# Patient Record
Sex: MALE | Race: WHITE | NOT HISPANIC OR LATINO | Employment: FULL TIME | URBAN - METROPOLITAN AREA
[De-identification: names, ages, dates, MRNs, and addresses within clinical notes are randomized per-mention and may not be internally consistent; named-entity substitution may affect disease eponyms.]

---

## 2019-05-08 PROBLEM — F17.200 TOBACCO DEPENDENCE: Status: ACTIVE | Noted: 2019-05-08

## 2019-05-08 PROBLEM — F41.1 GENERALIZED ANXIETY DISORDER: Status: ACTIVE | Noted: 2019-05-08

## 2019-05-08 PROBLEM — I10 ESSENTIAL HYPERTENSION: Status: ACTIVE | Noted: 2019-05-08

## 2019-05-08 PROBLEM — R00.0 SINUS TACHYCARDIA: Status: ACTIVE | Noted: 2019-05-08

## 2023-11-29 ENCOUNTER — OFFICE VISIT (OUTPATIENT)
Dept: PRIMARY CARE CLINIC | Facility: CLINIC | Age: 48
End: 2023-11-29
Payer: COMMERCIAL

## 2023-11-29 VITALS
HEART RATE: 83 BPM | SYSTOLIC BLOOD PRESSURE: 158 MMHG | BODY MASS INDEX: 26.09 KG/M2 | HEIGHT: 73 IN | DIASTOLIC BLOOD PRESSURE: 76 MMHG | OXYGEN SATURATION: 97 % | WEIGHT: 196.88 LBS

## 2023-11-29 DIAGNOSIS — Z12.12 ENCOUNTER FOR COLORECTAL CANCER SCREENING: ICD-10-CM

## 2023-11-29 DIAGNOSIS — I10 ESSENTIAL HYPERTENSION: ICD-10-CM

## 2023-11-29 DIAGNOSIS — Z00.00 ANNUAL PHYSICAL EXAM: Primary | ICD-10-CM

## 2023-11-29 DIAGNOSIS — Z13.1 SCREENING FOR DIABETES MELLITUS: ICD-10-CM

## 2023-11-29 DIAGNOSIS — Z12.11 ENCOUNTER FOR COLORECTAL CANCER SCREENING: ICD-10-CM

## 2023-11-29 DIAGNOSIS — F41.1 GENERALIZED ANXIETY DISORDER: ICD-10-CM

## 2023-11-29 PROCEDURE — 3008F PR BODY MASS INDEX (BMI) DOCUMENTED: ICD-10-PCS | Mod: CPTII,S$GLB,, | Performed by: STUDENT IN AN ORGANIZED HEALTH CARE EDUCATION/TRAINING PROGRAM

## 2023-11-29 PROCEDURE — 99386 PR PREVENTIVE VISIT,NEW,40-64: ICD-10-PCS | Mod: S$GLB,,, | Performed by: STUDENT IN AN ORGANIZED HEALTH CARE EDUCATION/TRAINING PROGRAM

## 2023-11-29 PROCEDURE — 99999 PR PBB SHADOW E&M-NEW PATIENT-LVL IV: ICD-10-PCS | Mod: PBBFAC,,, | Performed by: STUDENT IN AN ORGANIZED HEALTH CARE EDUCATION/TRAINING PROGRAM

## 2023-11-29 PROCEDURE — 99999 PR PBB SHADOW E&M-NEW PATIENT-LVL IV: CPT | Mod: PBBFAC,,, | Performed by: STUDENT IN AN ORGANIZED HEALTH CARE EDUCATION/TRAINING PROGRAM

## 2023-11-29 PROCEDURE — 99386 PREV VISIT NEW AGE 40-64: CPT | Mod: S$GLB,,, | Performed by: STUDENT IN AN ORGANIZED HEALTH CARE EDUCATION/TRAINING PROGRAM

## 2023-11-29 PROCEDURE — 3078F DIAST BP <80 MM HG: CPT | Mod: CPTII,S$GLB,, | Performed by: STUDENT IN AN ORGANIZED HEALTH CARE EDUCATION/TRAINING PROGRAM

## 2023-11-29 PROCEDURE — 3008F BODY MASS INDEX DOCD: CPT | Mod: CPTII,S$GLB,, | Performed by: STUDENT IN AN ORGANIZED HEALTH CARE EDUCATION/TRAINING PROGRAM

## 2023-11-29 PROCEDURE — 3077F SYST BP >= 140 MM HG: CPT | Mod: CPTII,S$GLB,, | Performed by: STUDENT IN AN ORGANIZED HEALTH CARE EDUCATION/TRAINING PROGRAM

## 2023-11-29 PROCEDURE — 1160F RVW MEDS BY RX/DR IN RCRD: CPT | Mod: CPTII,S$GLB,, | Performed by: STUDENT IN AN ORGANIZED HEALTH CARE EDUCATION/TRAINING PROGRAM

## 2023-11-29 PROCEDURE — 1160F PR REVIEW ALL MEDS BY PRESCRIBER/CLIN PHARMACIST DOCUMENTED: ICD-10-PCS | Mod: CPTII,S$GLB,, | Performed by: STUDENT IN AN ORGANIZED HEALTH CARE EDUCATION/TRAINING PROGRAM

## 2023-11-29 PROCEDURE — 1159F MED LIST DOCD IN RCRD: CPT | Mod: CPTII,S$GLB,, | Performed by: STUDENT IN AN ORGANIZED HEALTH CARE EDUCATION/TRAINING PROGRAM

## 2023-11-29 PROCEDURE — 1159F PR MEDICATION LIST DOCUMENTED IN MEDICAL RECORD: ICD-10-PCS | Mod: CPTII,S$GLB,, | Performed by: STUDENT IN AN ORGANIZED HEALTH CARE EDUCATION/TRAINING PROGRAM

## 2023-11-29 PROCEDURE — 3077F PR MOST RECENT SYSTOLIC BLOOD PRESSURE >= 140 MM HG: ICD-10-PCS | Mod: CPTII,S$GLB,, | Performed by: STUDENT IN AN ORGANIZED HEALTH CARE EDUCATION/TRAINING PROGRAM

## 2023-11-29 PROCEDURE — 3078F PR MOST RECENT DIASTOLIC BLOOD PRESSURE < 80 MM HG: ICD-10-PCS | Mod: CPTII,S$GLB,, | Performed by: STUDENT IN AN ORGANIZED HEALTH CARE EDUCATION/TRAINING PROGRAM

## 2023-11-29 RX ORDER — LISINOPRIL AND HYDROCHLOROTHIAZIDE 10; 12.5 MG/1; MG/1
1 TABLET ORAL DAILY
Qty: 90 TABLET | Refills: 3 | Status: SHIPPED | OUTPATIENT
Start: 2023-11-29 | End: 2023-11-29 | Stop reason: SDUPTHER

## 2023-11-29 RX ORDER — CITALOPRAM 20 MG/1
20 TABLET, FILM COATED ORAL DAILY
Qty: 90 TABLET | Refills: 3 | Status: SHIPPED | OUTPATIENT
Start: 2023-11-29

## 2023-11-29 RX ORDER — LISINOPRIL AND HYDROCHLOROTHIAZIDE 10; 12.5 MG/1; MG/1
1 TABLET ORAL DAILY
Qty: 90 TABLET | Refills: 3 | Status: SHIPPED | OUTPATIENT
Start: 2023-11-29

## 2023-11-29 NOTE — TELEPHONE ENCOUNTER
----- Message from Bucky Goodwin sent at 11/29/2023  9:21 AM CST -----  Name Of Caller: Sumanth        Provider Name: Ana Marrufo        Does patient feel the need to be seen today? no        Relationship to the Pt?: patient        Contact Preference?:  [phone]        What is the nature of the call?:Patient states that he needs to speak with someone in the office in regards to his medication, lisinopriL-hydrochlorothiazide (PRINZIDE,ZESTORETIC) 10-12.5 mg per tablet.

## 2023-11-29 NOTE — PROGRESS NOTES
SUBJECTIVE     Chief Complaint   Patient presents with    Establish Care    Annual Exam       HPI  Sumanth Marroquin is a 48 y.o. male with medical diagnoses as listed in the medical history and problem list that presents for annual exam. Pt is establishing care with me today.    Pt is UTD on age appropriate CA screening.    Family, social, surgical Hx reviewed     HTN: Lisinopril - hctz 10-12.5 mg daily metoprolol tartrate 25 mg daily    RICK: Celexa 20 mg daily    Currently daily pack per day smoker    Health Maintenance         Date Due Completion Date    Hepatitis C Screening Never done ---    Lipid Panel Never done ---    COVID-19 Vaccine (1) Never done ---    Pneumococcal Vaccines (Age 0-64) (1 - PCV) Never done ---    HIV Screening Never done ---    TETANUS VACCINE Never done ---    Hemoglobin A1c (Diabetic Prevention Screening) Never done ---    Colorectal Cancer Screening Never done ---    Influenza Vaccine (1) Never done ---                PAST MEDICAL HISTORY:  Past Medical History:   Diagnosis Date    Depression     Hypertension        PAST SURGICAL HISTORY:  No past surgical history on file.    SOCIAL HISTORY:  Social History     Socioeconomic History    Marital status: Single   Tobacco Use    Smoking status: Heavy Smoker     Current packs/day: 1.00     Types: Cigarettes   Substance and Sexual Activity    Alcohol use: Yes     Comment: occasionally    Drug use: Not Currently       FAMILY HISTORY:  No family history on file.    ALLERGIES AND MEDICATIONS: updated and reviewed.  Review of patient's allergies indicates:   Allergen Reactions    Penicillins Rash     Current Outpatient Medications   Medication Sig Dispense Refill    metoprolol tartrate (LOPRESSOR) 25 MG tablet Take 1 tablet (25 mg total) by mouth 2 (two) times daily as needed (for tachycardia). 15 tablet 0    citalopram (CELEXA) 20 MG tablet Take 1 tablet (20 mg total) by mouth once daily. 90 tablet 3    lisinopriL-hydrochlorothiazide  "(PRINZIDE,ZESTORETIC) 10-12.5 mg per tablet Take 1 tablet by mouth once daily. 90 tablet 3    lovastatin (MEVACOR) 20 MG tablet Take 1 tablet(s) every day by oral route.  0     No current facility-administered medications for this visit.       ROS  Review of Systems   Constitutional:  Negative for fever and weight loss.   Respiratory:  Negative for cough and shortness of breath.    Cardiovascular:  Negative for chest pain and palpitations.   Gastrointestinal:  Negative for abdominal pain, constipation, diarrhea, nausea and vomiting.   Genitourinary:  Negative for dysuria.   Musculoskeletal:  Negative for back pain and joint pain.   Skin:  Negative for rash.   Neurological:  Negative for dizziness, weakness and headaches.   Psychiatric/Behavioral:  Negative for depression. The patient is not nervous/anxious.            OBJECTIVE     Physical Exam  Vitals:    11/29/23 0735   BP: (!) 158/76   Pulse: 83    Body mass index is 25.97 kg/m².  Weight: 89.3 kg (196 lb 13.9 oz)   Height: 6' 1" (185.4 cm)     Physical Exam  HENT:      Head: Normocephalic and atraumatic.      Nose: Nose normal.      Mouth/Throat:      Mouth: Mucous membranes are moist.      Pharynx: Oropharynx is clear.   Eyes:      Extraocular Movements: Extraocular movements intact.      Conjunctiva/sclera: Conjunctivae normal.      Pupils: Pupils are equal, round, and reactive to light.   Cardiovascular:      Rate and Rhythm: Normal rate and regular rhythm.   Pulmonary:      Effort: Pulmonary effort is normal.      Breath sounds: Normal breath sounds.   Musculoskeletal:         General: No swelling. Normal range of motion.      Cervical back: Normal range of motion.      Right lower leg: No edema.      Left lower leg: No edema.   Skin:     General: Skin is warm.      Findings: No lesion or rash.   Neurological:      General: No focal deficit present.      Mental Status: He is alert and oriented to person, place, and time.      Motor: No weakness. "               ASSESSMENT     48 y.o. male with     1. Annual physical exam    2. Essential hypertension    3. Generalized anxiety disorder    4. Screening for diabetes mellitus    5. Encounter for colorectal cancer screening        PLAN:     1. Annual physical exam  -     HIV 1/2 Ag/Ab (4th Gen); Future; Expected date: 11/29/2023  -     Hepatitis C Antibody; Future; Expected date: 11/29/2023  -     TSH; Future; Expected date: 11/29/2023  -     Lipid Panel; Future; Expected date: 11/29/2023  -     Comprehensive Metabolic Panel; Future; Expected date: 11/29/2023  -     CBC Auto Differential; Future; Expected date: 11/29/2023    2. Essential hypertension  Overview:  Lisinopril - hctz 10-12.5 mg daily metoprolol tartrate 25 mg daily  Not at goal-out of medication  Follow up with reading in 2 weeks      Orders:  -     lisinopriL-hydrochlorothiazide (PRINZIDE,ZESTORETIC) 10-12.5 mg per tablet; Take 1 tablet by mouth once daily.  Dispense: 90 tablet; Refill: 3    3. Generalized anxiety disorder  Overview:  Celexa 20 mg daily    Stable on medications, continue regimen    -     citalopram (CELEXA) 20 MG tablet; Take 1 tablet (20 mg total) by mouth once daily.  Dispense: 90 tablet; Refill: 3    4. Screening for diabetes mellitus  -     Hemoglobin A1C; Future; Expected date: 11/29/2023    5. Encounter for colorectal cancer screening  Patient not interested in pursuing Colorectal cancer screening at this time.  Discussed importance of screening.  Will follow-up with patient in 1 month to discuss if he would prefer Cologuard test versus colonoscopy.            Discussed age and gender appropriate screenings at this visit and encouraged a healthy diet low in simple carbohydrates, and increased physical activity.  Counseled on medically appropriate vaccines based on age and current health condition.  Screening test reviewed and discussed with patient.       RTC in 6 months    Ana Marrufo MD  11/29/2023 7:42 AM

## 2023-12-15 ENCOUNTER — TELEPHONE (OUTPATIENT)
Dept: PRIMARY CARE CLINIC | Facility: CLINIC | Age: 48
End: 2023-12-15
Payer: COMMERCIAL

## 2023-12-15 NOTE — TELEPHONE ENCOUNTER
Called pt, no answer, lvm to call back    *need to ask colon test or colonoscopy and schedule bp check unless he has recent bp reading log

## 2023-12-15 NOTE — TELEPHONE ENCOUNTER
----- Message from Eva Hoffman MA sent at 12/15/2023  8:29 AM CST -----      ----- Message -----  From: Ana Marrufo MD  Sent: 12/15/2023   7:58 AM CST  To: Yaron ANDERSON Staff    Also, we discussed colon cancer screening at his last appointment and he stated he needed time to decide.  Can we ask if he would like to do the Cologuard (stool test) versus the colonoscopy?  ----- Message -----  From: Ana Marrufo MD  Sent: 12/12/2023  12:00 AM CST  To: Ana Marrufo MD    Cologuard vs colonoscopy?

## 2024-02-06 DIAGNOSIS — Z12.11 COLON CANCER SCREENING: ICD-10-CM

## 2024-08-30 ENCOUNTER — TELEPHONE (OUTPATIENT)
Dept: PHARMACY | Facility: CLINIC | Age: 49
End: 2024-08-30
Payer: COMMERCIAL

## 2024-08-30 NOTE — TELEPHONE ENCOUNTER
[facility]/Population Health Chart Review & Patient Outreach Details For Medication Adherence Project    Reason for Outreach Encounter: 3rd Party payor non-compliance report ([employer], [employer], C, etc)    2.  Patient Outreach Method: Reviewed patient chart     3.   Medication in question:   Hypertension Medications               lisinopriL-hydrochlorothiazide (PRINZIDE,ZESTORETIC) 10-12.5 mg per tablet Take 1 tablet by mouth once daily.    metoprolol tartrate (LOPRESSOR) 25 MG tablet Take 1 tablet (25 mg total) by mouth 2 (two) times daily as needed (for tachycardia).               LAST FILLED: 7/16/24 for 90 day supply    4.  Reviewed and or Updates Made To: Patient Chart    5. Outreach Outcomes and/or actions taken: Patient filled medication and is on track to be adherent    Additional Notes:

## 2025-01-29 DIAGNOSIS — I10 ESSENTIAL HYPERTENSION: ICD-10-CM

## 2025-02-02 DIAGNOSIS — I10 ESSENTIAL HYPERTENSION: ICD-10-CM

## 2025-02-02 RX ORDER — CITALOPRAM 20 MG/1
20 TABLET, FILM COATED ORAL
Qty: 90 TABLET | Refills: 0 | Status: SHIPPED | OUTPATIENT
Start: 2025-02-02

## 2025-02-02 NOTE — TELEPHONE ENCOUNTER
Care Due:                  Date            Visit Type   Department     Provider  --------------------------------------------------------------------------------                                NP -                              PRIMARY      [facility]  Last Visit: 11-      CARE ([facility])   CARE           Ana Marrufo  Next Visit: None Scheduled  None         None Found                                                            Last  Test          Frequency    Reason                     Performed    Due Date  --------------------------------------------------------------------------------    Office Visit  15 months..  citalopram,                11- 02-                             lisinopriL-hydrochlorothi                             azide....................    CMP.........  12 months..  lisinopriL-hydrochlorothi  Not Found    Overdue                             azide....................    [employer] Embedded Care Due Messages. Reference number: [id number].   2/02/2025 12:22:23 AM CST

## 2025-02-03 RX ORDER — LISINOPRIL AND HYDROCHLOROTHIAZIDE 10; 12.5 MG/1; MG/1
1 TABLET ORAL
Qty: 90 TABLET | Refills: 3 | Status: SHIPPED | OUTPATIENT
Start: 2025-02-03

## 2025-02-03 NOTE — TELEPHONE ENCOUNTER
Refill Routing Note   Medication(s) are not appropriate for processing by [facility] for the following reason(s):        Required labs outdated  Required vitals outdated    [facility] action(s):  Defer  Approve   Requires appointment : Yes   Requires labs : Yes             Appointments  past 12m or future 3m with PCP    Date Provider   Last Visit   11/29/2023 Ana Marrufo MD   Next Visit   Visit date not found Ana Marrufo MD   ED visits in past 90 days: 0        Note composed:10:39 PM 02/02/2025

## 2025-05-08 ENCOUNTER — TELEPHONE (OUTPATIENT)
Dept: PHARMACY | Facility: CLINIC | Age: 50
End: 2025-05-08
Payer: COMMERCIAL

## 2025-05-08 NOTE — TELEPHONE ENCOUNTER
[facility]/Population Health Chart Review & Patient Outreach Details For Medication Adherence Project    Reason for Outreach Encounter: 3rd Party payor non-compliance report ([employer], [employer], C, etc)  2.  Patient Outreach Method: Reviewed Patient Chart  3.   Medication in question: prinzide    LAST FILLED: 5/5/25 for 90 day supply  Hypertension Medications              lisinopriL-hydrochlorothiazide (PRINZIDE,ZESTORETIC) 10-12.5 mg per tablet TAKE 1 TABLET BY MOUTH EVERY DAY    metoprolol tartrate (LOPRESSOR) 25 MG tablet Take 1 tablet (25 mg total) by mouth 2 (two) times daily as needed (for tachycardia).              4.  Reviewed and or Updates Made To: Patient Chart  5. Outreach Outcomes and/or actions taken: Patient filled medication and is on track to be adherent

## 2025-05-09 ENCOUNTER — TELEPHONE (OUTPATIENT)
Dept: PRIMARY CARE CLINIC | Facility: CLINIC | Age: 50
End: 2025-05-09
Payer: COMMERCIAL

## 2025-05-09 DIAGNOSIS — Z00.00 ANNUAL PHYSICAL EXAM: ICD-10-CM

## 2025-05-09 DIAGNOSIS — Z13.1 SCREENING FOR DIABETES MELLITUS: Primary | ICD-10-CM

## 2025-05-09 DIAGNOSIS — Z12.5 PROSTATE CANCER SCREENING: ICD-10-CM

## 2025-05-09 NOTE — TELEPHONE ENCOUNTER
----- Message from Flor sent at 5/9/2025  9:39 AM CDT -----  Contact:   patient  type: Skye is requesting to schedule their Lab appointment prior to an appointment.Order is not listed in EPIC.  Please enter order and contact patient to schedule.Preferred Date and Time of Labs: 05 27 2025 8 am Date of Appointment 06 03 2025 [facility] [place] [facility] rather a call back or a response via [facility]? Yes - Call Best Call Back Number: [phone] Information:na

## 2025-06-03 ENCOUNTER — PATIENT MESSAGE (OUTPATIENT)
Dept: PRIMARY CARE CLINIC | Facility: CLINIC | Age: 50
End: 2025-06-03

## 2025-06-03 ENCOUNTER — OFFICE VISIT (OUTPATIENT)
Dept: PRIMARY CARE CLINIC | Facility: CLINIC | Age: 50
End: 2025-06-03
Payer: COMMERCIAL

## 2025-06-03 VITALS
SYSTOLIC BLOOD PRESSURE: 160 MMHG | OXYGEN SATURATION: 96 % | WEIGHT: 205 LBS | DIASTOLIC BLOOD PRESSURE: 100 MMHG | BODY MASS INDEX: 27.05 KG/M2 | HEART RATE: 115 BPM

## 2025-06-03 DIAGNOSIS — Z13.1 SCREENING FOR DIABETES MELLITUS: ICD-10-CM

## 2025-06-03 DIAGNOSIS — R00.0 TACHYCARDIA: ICD-10-CM

## 2025-06-03 DIAGNOSIS — Z12.5 PROSTATE CANCER SCREENING: ICD-10-CM

## 2025-06-03 DIAGNOSIS — Z00.00 ANNUAL PHYSICAL EXAM: Primary | ICD-10-CM

## 2025-06-03 DIAGNOSIS — F17.219 CIGARETTE NICOTINE DEPENDENCE WITH NICOTINE-INDUCED DISORDER: ICD-10-CM

## 2025-06-03 DIAGNOSIS — R22.32 LUMP OF AXILLA, LEFT: Primary | ICD-10-CM

## 2025-06-03 DIAGNOSIS — I10 ESSENTIAL HYPERTENSION: ICD-10-CM

## 2025-06-03 PROCEDURE — 1160F RVW MEDS BY RX/DR IN RCRD: CPT | Mod: CPTII,S$GLB,, | Performed by: STUDENT IN AN ORGANIZED HEALTH CARE EDUCATION/TRAINING PROGRAM

## 2025-06-03 PROCEDURE — 3077F SYST BP >= 140 MM HG: CPT | Mod: CPTII,S$GLB,, | Performed by: STUDENT IN AN ORGANIZED HEALTH CARE EDUCATION/TRAINING PROGRAM

## 2025-06-03 PROCEDURE — 93005 ELECTROCARDIOGRAM TRACING: CPT | Mod: S$GLB,,, | Performed by: STUDENT IN AN ORGANIZED HEALTH CARE EDUCATION/TRAINING PROGRAM

## 2025-06-03 PROCEDURE — 99396 PREV VISIT EST AGE 40-64: CPT | Mod: S$GLB,,, | Performed by: STUDENT IN AN ORGANIZED HEALTH CARE EDUCATION/TRAINING PROGRAM

## 2025-06-03 PROCEDURE — 99999 PR PBB SHADOW E&M-EST. PATIENT-LVL IV: CPT | Mod: PBBFAC,,, | Performed by: STUDENT IN AN ORGANIZED HEALTH CARE EDUCATION/TRAINING PROGRAM

## 2025-06-03 PROCEDURE — 3008F BODY MASS INDEX DOCD: CPT | Mod: CPTII,S$GLB,, | Performed by: STUDENT IN AN ORGANIZED HEALTH CARE EDUCATION/TRAINING PROGRAM

## 2025-06-03 PROCEDURE — 1159F MED LIST DOCD IN RCRD: CPT | Mod: CPTII,S$GLB,, | Performed by: STUDENT IN AN ORGANIZED HEALTH CARE EDUCATION/TRAINING PROGRAM

## 2025-06-03 PROCEDURE — 93010 ELECTROCARDIOGRAM REPORT: CPT | Mod: S$GLB,,, | Performed by: INTERNAL MEDICINE

## 2025-06-03 PROCEDURE — 4010F ACE/ARB THERAPY RXD/TAKEN: CPT | Mod: CPTII,S$GLB,, | Performed by: STUDENT IN AN ORGANIZED HEALTH CARE EDUCATION/TRAINING PROGRAM

## 2025-06-03 PROCEDURE — 3080F DIAST BP >= 90 MM HG: CPT | Mod: CPTII,S$GLB,, | Performed by: STUDENT IN AN ORGANIZED HEALTH CARE EDUCATION/TRAINING PROGRAM

## 2025-06-03 PROCEDURE — 99215 OFFICE O/P EST HI 40 MIN: CPT | Mod: 25,S$GLB,, | Performed by: STUDENT IN AN ORGANIZED HEALTH CARE EDUCATION/TRAINING PROGRAM

## 2025-06-03 RX ORDER — METOPROLOL TARTRATE 25 MG/1
25 TABLET, FILM COATED ORAL 2 TIMES DAILY PRN
Qty: 15 TABLET | Refills: 0 | Status: SHIPPED | OUTPATIENT
Start: 2025-06-03 | End: 2025-06-05 | Stop reason: SDUPTHER

## 2025-06-03 RX ORDER — LISINOPRIL AND HYDROCHLOROTHIAZIDE 10; 12.5 MG/1; MG/1
1 TABLET ORAL DAILY
Qty: 90 TABLET | Refills: 3 | Status: SHIPPED | OUTPATIENT
Start: 2025-06-03

## 2025-06-04 LAB
OHS QRS DURATION: 78 MS
OHS QTC CALCULATION: 478 MS

## 2025-06-05 RX ORDER — METOPROLOL TARTRATE 25 MG/1
25 TABLET, FILM COATED ORAL 2 TIMES DAILY PRN
Qty: 60 TABLET | Refills: 5 | Status: SHIPPED | OUTPATIENT
Start: 2025-06-05

## 2025-06-08 DIAGNOSIS — R22.32 MASS OF LEFT AXILLA: Primary | ICD-10-CM

## 2025-06-08 DIAGNOSIS — R00.0 TACHYCARDIA: ICD-10-CM

## 2025-06-08 DIAGNOSIS — I10 ESSENTIAL HYPERTENSION: ICD-10-CM

## 2025-06-08 RX ORDER — METOPROLOL TARTRATE 25 MG/1
25 TABLET, FILM COATED ORAL 2 TIMES DAILY PRN
Qty: 60 TABLET | Refills: 5 | Status: SHIPPED | OUTPATIENT
Start: 2025-06-08

## 2025-06-12 ENCOUNTER — TELEPHONE (OUTPATIENT)
Dept: SMOKING CESSATION | Facility: CLINIC | Age: 50
End: 2025-06-12
Payer: COMMERCIAL

## 2025-06-12 ENCOUNTER — CLINICAL SUPPORT (OUTPATIENT)
Dept: SMOKING CESSATION | Facility: CLINIC | Age: 50
End: 2025-06-12
Payer: COMMERCIAL

## 2025-06-12 DIAGNOSIS — F17.200 NICOTINE DEPENDENCE: Primary | ICD-10-CM

## 2025-06-12 RX ORDER — VARENICLINE TARTRATE 0.5 (11)-1
KIT ORAL
Qty: 53 TABLET | Refills: 0 | Status: SHIPPED | OUTPATIENT
Start: 2025-06-12

## 2025-06-12 NOTE — TELEPHONE ENCOUNTER
Smoking Cessation called patient regarding no show virtual quit intake appointment this afternoon. Left a message with contact for patient to call back to reschedule intake.  Mary Gaming RRT,[facility]  [phone]

## 2025-06-12 NOTE — PROGRESS NOTES
The patient location is: home  The chief complaint leading to consultation is: nicotine dependence      Visit type: audiovisual  Face to Face time with patient: 60 minutes of total time spent on the encounter, which includes face to face time and non-face to face time preparing to see the patient (eg, review of tests), Obtaining and/or reviewing separately obtained history, Documenting clinical information in the electronic or other health record, Independently interpreting results (not separately reported) and communicating results to the patient/family/caregiver, or Care coordination (not separately reported). Technical problem, had to reconnect after virtual problem.      Each patient to whom he or she provides medical services by telemedicine is:  (1) informed of the relationship between the physician and patient and the respective role of any other health care provider with respect to management of the patient; and (2) notified that he or she may decline to receive medical services by telemedicine and may withdraw from such care at any time.     Note:  Patient reports smoking up to 1 pack of cigarettes a day on days that he is drinking in the evening he may smoke up to 2 pks. FTND score of 8 indicates a high level of nicotine dependence. MORGAN-D score of 24 perceived as significant degree of mental distress / probable depression. Patient will begin the prescribed tobacco cessation medication regimen of 0.5-1 mg Chantix. Nicotine patch was not prescribed due to high BP. Patient monitors BP at home.  Discussed and reviewed with the patient regarding NRT's and medication along with behavioral therapy & counseling to assist patient with meeting his goal of being smoke free. Patient is agreeable to participate in bi-weekly sessions as well as group therapy when it is available. Patient educated on role & usage possible side effects. Reviewed behavioral modification strategy of rate reduction and wait time of 15 min  prior to smoking. Patient verbalized understanding & willingness to apply. Patient instructed to call TTS with any questions or concerns.

## 2025-06-16 ENCOUNTER — TELEPHONE (OUTPATIENT)
Dept: PRIMARY CARE CLINIC | Facility: CLINIC | Age: 50
End: 2025-06-16
Payer: COMMERCIAL

## 2025-06-16 NOTE — TELEPHONE ENCOUNTER
Copied from CRM #[id number]. Topic: Appointments - Appointment Access  >> Jun 16, 2025 10:55 AM [place] wrote:  Type: General Call Back         Name of Caller:pt  Symptoms:high blood pressure  Would the patient rather a call back or a response via [facility]? call  Best Call Back Number:[phone]   Additional Information: Pt state yesterday his blood pressure was 195/111 and today it's 170/100. Pt has to take a flight to [place] for work tomorrow and wants to know if it's safe for him to fly.

## 2025-06-16 NOTE — TELEPHONE ENCOUNTER
Take 2 of lisinopril/Hctz and monitor BP twice daily while on trip.    If HA/CP/SOB/dizziness, needs to go to ED

## 2025-06-18 ENCOUNTER — LAB VISIT (OUTPATIENT)
Dept: LAB | Facility: HOSPITAL | Age: 50
End: 2025-06-18
Attending: STUDENT IN AN ORGANIZED HEALTH CARE EDUCATION/TRAINING PROGRAM
Payer: COMMERCIAL

## 2025-06-18 ENCOUNTER — RESULTS FOLLOW-UP (OUTPATIENT)
Dept: PRIMARY CARE CLINIC | Facility: CLINIC | Age: 50
End: 2025-06-18
Payer: COMMERCIAL

## 2025-06-18 DIAGNOSIS — Z12.5 PROSTATE CANCER SCREENING: ICD-10-CM

## 2025-06-18 DIAGNOSIS — Z13.1 SCREENING FOR DIABETES MELLITUS: ICD-10-CM

## 2025-06-18 DIAGNOSIS — Z00.00 ANNUAL PHYSICAL EXAM: ICD-10-CM

## 2025-06-18 LAB
ABSOLUTE EOSINOPHIL (OHS): 0.14 K/UL
ABSOLUTE MONOCYTE (OHS): 1.01 K/UL (ref 0.3–1)
ABSOLUTE NEUTROPHIL COUNT (OHS): 7.75 K/UL (ref 1.8–7.7)
ALBUMIN SERPL BCP-MCNC: 4.4 G/DL (ref 3.5–5.2)
ALP SERPL-CCNC: 83 UNIT/L (ref 40–150)
ALT SERPL W/O P-5'-P-CCNC: 81 UNIT/L (ref 10–44)
ANION GAP (OHS): 9 MMOL/L (ref 8–16)
AST SERPL-CCNC: 38 UNIT/L (ref 11–45)
BASOPHILS # BLD AUTO: 0.12 K/UL
BASOPHILS NFR BLD AUTO: 0.9 %
BILIRUB SERPL-MCNC: 0.7 MG/DL (ref 0.1–1)
BUN SERPL-MCNC: 18 MG/DL (ref 6–20)
CALCIUM SERPL-MCNC: 9.4 MG/DL (ref 8.7–10.5)
CHLORIDE SERPL-SCNC: 94 MMOL/L (ref 95–110)
CHOLEST SERPL-MCNC: 253 MG/DL (ref 120–199)
CHOLEST/HDLC SERPL: 8.4 {RATIO} (ref 2–5)
CO2 SERPL-SCNC: 29 MMOL/L (ref 23–29)
CREAT SERPL-MCNC: 1.3 MG/DL (ref 0.5–1.4)
EAG (OHS): 117 MG/DL (ref 68–131)
ERYTHROCYTE [DISTWIDTH] IN BLOOD BY AUTOMATED COUNT: 12.6 % (ref 11.5–14.5)
GFR SERPLBLD CREATININE-BSD FMLA CKD-EPI: >60 ML/MIN/1.73/M2
GLUCOSE SERPL-MCNC: 127 MG/DL (ref 70–110)
HBA1C MFR BLD: 5.7 % (ref 4–5.6)
HCT VFR BLD AUTO: 54.8 % (ref 40–54)
HDLC SERPL-MCNC: 30 MG/DL (ref 40–75)
HDLC SERPL: 11.9 % (ref 20–50)
HGB BLD-MCNC: 19 GM/DL (ref 14–18)
IMM GRANULOCYTES # BLD AUTO: 0.05 K/UL (ref 0–0.04)
IMM GRANULOCYTES NFR BLD AUTO: 0.4 % (ref 0–0.5)
LDLC SERPL CALC-MCNC: 163.8 MG/DL (ref 63–159)
LYMPHOCYTES # BLD AUTO: 4.22 K/UL (ref 1–4.8)
MCH RBC QN AUTO: 34.5 PG (ref 27–31)
MCHC RBC AUTO-ENTMCNC: 34.7 G/DL (ref 32–36)
MCV RBC AUTO: 100 FL (ref 82–98)
NONHDLC SERPL-MCNC: 223 MG/DL
NUCLEATED RBC (/100WBC) (OHS): 0 /100 WBC
PLATELET # BLD AUTO: 302 K/UL (ref 150–450)
PMV BLD AUTO: 11.1 FL (ref 9.2–12.9)
POTASSIUM SERPL-SCNC: 4 MMOL/L (ref 3.5–5.1)
PROT SERPL-MCNC: 8 GM/DL (ref 6–8.4)
PSA SERPL-MCNC: 1.04 NG/ML
RBC # BLD AUTO: 5.5 M/UL (ref 4.6–6.2)
RELATIVE EOSINOPHIL (OHS): 1.1 %
RELATIVE LYMPHOCYTE (OHS): 31.8 % (ref 18–48)
RELATIVE MONOCYTE (OHS): 7.6 % (ref 4–15)
RELATIVE NEUTROPHIL (OHS): 58.2 % (ref 38–73)
SODIUM SERPL-SCNC: 132 MMOL/L (ref 136–145)
TRIGL SERPL-MCNC: 296 MG/DL (ref 30–150)
TSH SERPL-ACNC: 1.64 UIU/ML (ref 0.4–4)
WBC # BLD AUTO: 13.29 K/UL (ref 3.9–12.7)

## 2025-06-18 PROCEDURE — 85025 COMPLETE CBC W/AUTO DIFF WBC: CPT

## 2025-06-18 PROCEDURE — 36415 COLL VENOUS BLD VENIPUNCTURE: CPT

## 2025-06-18 PROCEDURE — 83036 HEMOGLOBIN GLYCOSYLATED A1C: CPT

## 2025-06-18 PROCEDURE — 82465 ASSAY BLD/SERUM CHOLESTEROL: CPT

## 2025-06-18 PROCEDURE — 82947 ASSAY GLUCOSE BLOOD QUANT: CPT

## 2025-06-18 PROCEDURE — 84443 ASSAY THYROID STIM HORMONE: CPT

## 2025-06-18 PROCEDURE — 84153 ASSAY OF PSA TOTAL: CPT

## 2025-06-20 ENCOUNTER — PATIENT MESSAGE (OUTPATIENT)
Dept: PRIMARY CARE CLINIC | Facility: CLINIC | Age: 50
End: 2025-06-20
Payer: COMMERCIAL

## 2025-06-23 ENCOUNTER — OFFICE VISIT (OUTPATIENT)
Dept: CARDIOLOGY | Facility: CLINIC | Age: 50
End: 2025-06-23
Payer: COMMERCIAL

## 2025-06-23 VITALS
WEIGHT: 199.5 LBS | SYSTOLIC BLOOD PRESSURE: 146 MMHG | BODY MASS INDEX: 26.44 KG/M2 | DIASTOLIC BLOOD PRESSURE: 104 MMHG | HEART RATE: 80 BPM | HEIGHT: 73 IN

## 2025-06-23 DIAGNOSIS — E78.2 MIXED HYPERLIPIDEMIA: ICD-10-CM

## 2025-06-23 DIAGNOSIS — R00.0 TACHYCARDIA: ICD-10-CM

## 2025-06-23 DIAGNOSIS — Z91.89 FRAMINGHAM CARDIAC RISK >20% IN NEXT 10 YEARS: Primary | ICD-10-CM

## 2025-06-23 DIAGNOSIS — F17.200 TOBACCO DEPENDENCE: ICD-10-CM

## 2025-06-23 DIAGNOSIS — I10 ESSENTIAL HYPERTENSION: ICD-10-CM

## 2025-06-23 DIAGNOSIS — R73.03 PREDIABETES: ICD-10-CM

## 2025-06-23 DIAGNOSIS — R00.0 SINUS TACHYCARDIA: ICD-10-CM

## 2025-06-23 PROCEDURE — 3077F SYST BP >= 140 MM HG: CPT | Mod: CPTII,S$GLB,, | Performed by: INTERNAL MEDICINE

## 2025-06-23 PROCEDURE — 99204 OFFICE O/P NEW MOD 45 MIN: CPT | Mod: S$GLB,,, | Performed by: INTERNAL MEDICINE

## 2025-06-23 PROCEDURE — 3080F DIAST BP >= 90 MM HG: CPT | Mod: CPTII,S$GLB,, | Performed by: INTERNAL MEDICINE

## 2025-06-23 PROCEDURE — 99999 PR PBB SHADOW E&M-EST. PATIENT-LVL III: CPT | Mod: PBBFAC,,, | Performed by: INTERNAL MEDICINE

## 2025-06-23 PROCEDURE — 1159F MED LIST DOCD IN RCRD: CPT | Mod: CPTII,S$GLB,, | Performed by: INTERNAL MEDICINE

## 2025-06-23 PROCEDURE — 3044F HG A1C LEVEL LT 7.0%: CPT | Mod: CPTII,S$GLB,, | Performed by: INTERNAL MEDICINE

## 2025-06-23 PROCEDURE — 4010F ACE/ARB THERAPY RXD/TAKEN: CPT | Mod: CPTII,S$GLB,, | Performed by: INTERNAL MEDICINE

## 2025-06-23 PROCEDURE — 3008F BODY MASS INDEX DOCD: CPT | Mod: CPTII,S$GLB,, | Performed by: INTERNAL MEDICINE

## 2025-06-23 RX ORDER — METOPROLOL TARTRATE 25 MG/1
25 TABLET, FILM COATED ORAL 2 TIMES DAILY PRN
Qty: 60 TABLET | Refills: 11 | Status: SHIPPED | OUTPATIENT
Start: 2025-06-23

## 2025-06-23 RX ORDER — ASPIRIN 81 MG/1
81 TABLET ORAL DAILY
COMMUNITY
Start: 2025-06-23 | End: 2026-06-23

## 2025-06-23 RX ORDER — EZETIMIBE 10 MG/1
10 TABLET ORAL DAILY
Qty: 30 TABLET | Refills: 11 | Status: SHIPPED | OUTPATIENT
Start: 2025-06-23 | End: 2026-06-23

## 2025-06-23 RX ORDER — ROSUVASTATIN CALCIUM 40 MG/1
40 TABLET, COATED ORAL NIGHTLY
Qty: 30 TABLET | Refills: 11 | Status: SHIPPED | OUTPATIENT
Start: 2025-06-23 | End: 2026-06-23

## 2025-06-23 RX ORDER — LISINOPRIL AND HYDROCHLOROTHIAZIDE 10; 12.5 MG/1; MG/1
1 TABLET ORAL DAILY
Qty: 60 TABLET | Refills: 11 | Status: SHIPPED | OUTPATIENT
Start: 2025-06-23

## 2025-06-23 NOTE — PROGRESS NOTES
Subjective:   06/23/2025     Patient ID:  Sumanth Marroquin is a 49 y.o. male who presents for evaulation of Results       History of Present Illness    CHIEF COMPLAINT:  Presents for follow-up to discuss cardiac risk factors and recent EKG results as referred by Dr. Marrufo.    HPI:  Patient had an EKG performed 10 days ago during a previous appointment. He has a family history of heart disease, with his mother having a congenital heart malformation and HTN. He is pre-diabetic and has very abnormal cholesterol and triglyceride levels based on recent fasting labs. He has been taking a statin medication for 1 week. He is currently taking Lisinopril with HCTZ and Metoprolol for BP management. He reports taking Metoprolol BID for 2 days, having previously only taken it QD. He has been taking Lisinopril with HCTZ as prescribed, BID.    He denies CP, tightness in the chest, or any significant abnormalities on the recent EKG. He also denies a history of ulcers or bleeding in the stomach, swelling of lower extremities, and MI.    CARDIAC HISTORY:  EKG 06/13/2025: no significant abnormalities EKG 06/13/2025: no significant abnormalities    MEDICATIONS:  Lisinopril with HCTZ twice daily (morning and evening) for HTN, increased from daily  Metoprolol twice daily (morning and evening) for HTN, increased from daily 2 days ago  Rosuvastatin for cholesterol  Discontinued statin medication, used for 1 week    TEST RESULTS:  Patient's recent cholesterol and triglycerides tests showed very abnormal, elevated results. His bad cholesterol levels were also recently found to be elevated.    MEDICAL HISTORY:  Patient has a history of pre-diabetes, hyperlipidemia (HLD) with elevated cholesterol and triglycerides, and hypertension (HTN).    FAMILY HISTORY:  Mother: congenital heart malformation, HTN Family history is significant for unknown medical history of father.    SOCIAL HISTORY:  Smoking: Current smoker  Alcohol Use: Reports excessive  alcohol consumption recently      ROS:  General: -fever, -chills, -fatigue, -weight gain, -weight loss, +increased substance use, +alcohol abuse  Eyes: -vision changes, -redness, -discharge  ENT: -ear pain, -nasal congestion, -sore throat  Cardiovascular: -chest pain, -palpitations, -lower extremity edema  Respiratory: -cough, -shortness of breath  Gastrointestinal: -abdominal pain, -nausea, -vomiting, -diarrhea, -constipation, -blood in stool  Genitourinary: -dysuria, -hematuria, -frequency  Musculoskeletal: -joint pain, -muscle pain  Skin: -rash, -lesion  Neurological: -headache, -dizziness, -numbness, -tingling  Psychiatric: -anxiety, -depression, -sleep difficulty        The 10-year ASCVD risk score (Fay OCONNELL, et al., 2019) is: 22.6%    Values used to calculate the score:      Age: 49 years      Sex: Male      Is Non- : No      Diabetic: No      Tobacco smoker: Yes      Systolic Blood Pressure: 146 mmHg      Is BP treated: Yes      HDL Cholesterol: 30 mg/dL      Total Cholesterol: 253 mg/dL    Problem List[1]     Review of patient's allergies indicates:   Allergen Reactions    Penicillins Rash       Current Medications[2]     Objective:   Physical Exam    Vitals: Elevated blood pressure.  General: No acute distress. Well-developed. Well-nourished.  Eyes: EOMI. Sclerae anicteric.  HENT: Normocephalic. Atraumatic. Nares patent. Moist oral mucosa.  Cardiovascular: Regular rate. Regular rhythm. No murmurs. No rubs. No gallops. Normal S1, S2.  Respiratory: Normal respiratory effort. Clear to auscultation bilaterally. No rales. No rhonchi. No wheezing.  Musculoskeletal: No  obvious deformity.  Extremities: No lower extremity edema.  Neurological: Alert & oriented x3. No slurred speech. Normal gait.  Psychiatric: Normal mood. Normal affect. Good insight. Good judgment.  Skin: Warm. Dry. No rash.          Vitals:    06/23/25 1302   BP: (!) 146/104   Pulse: 80     Wt Readings from Last 3  Encounters:   06/23/25 90.5 kg (199 lb 8.3 oz)   06/03/25 93 kg (205 lb 0.4 oz)   11/29/23 89.3 kg (196 lb 13.9 oz)     Temp Readings from Last 3 Encounters:   04/29/19 98.7 °F (37.1 °C) (Oral)   04/25/19 98.8 °F (37.1 °C) (Oral)     BP Readings from Last 3 Encounters:   06/23/25 (!) 146/104   06/03/25 (!) 160/100   11/29/23 (!) 158/76     Pulse Readings from Last 3 Encounters:   06/23/25 80   06/03/25 (!) 115   11/29/23 83               Lab Results   Component Value Date    CHOL 253 (H) 06/18/2025     Lab Results   Component Value Date    HDL 30 (L) 06/18/2025     Lab Results   Component Value Date    LDLCALC 163.8 (H) 06/18/2025     Lab Results   Component Value Date    ALT 81 (H) 06/18/2025    AST 38 06/18/2025     Lab Results   Component Value Date    CREATININE 1.3 06/18/2025    BUN 18 06/18/2025     (L) 06/18/2025    K 4.0 06/18/2025    CO2 29 06/18/2025     Lab Results   Component Value Date    HGB 19.0 (H) 06/18/2025    HCT 54.8 (H) 06/18/2025       Assessment and Plan:   Assessment & Plan    Z91.89 [place] cardiac risk >20% in next 10 years  I10 Essential hypertension  R00.0 Tachycardia  F17.200 Tobacco dependence  E78.2 Mixed hyperlipidemia  R73.03 Prediabetes    IMPRESSION:  - Blood pressure has improved but remains elevated.  - Aggressive cholesterol management warranted given very abnormal recent lipid panel results.  - Adjusted antihypertensive regimen to optimize blood pressure control.    [place] CARDIAC RISK >20% IN NEXT 10 YEARS:  - Educated on cardiovascular risk factors and importance of addressing modifiable risk factors.  - Started aspirin 81 mg daily for cardiovascular protection after confirming no history of ulcers or stomach bleeding.  - Ordered lipid panel and other relevant labs in 1 month.  - Follow up in 1 month to review lab results and home blood pressure readings.    ESSENTIAL HYPERTENSION:  - Patient to monitor blood pressure at home regularly.  - Continued Lisinopril  with HCTZ, adjusted to daily dosing with higher strength pill.  - Follow up in 1 month to review lab results and home blood pressure readings.    TACHYCARDIA:  - Continued Metoprolol, adjusted to twice daily dosing.    TOBACCO DEPENDENCE:  - Patient to stop smoking.  - Discussed potential side effects of Chantix, specifically nightmares.    MIXED HYPERLIPIDEMIA:  - Patient to change diet according to provided dietary recommendations.  - Started rosuvastatin 20 mg daily for cholesterol with cholesterol absorption blocker to improve cholesterol-lowering effect, ezetimibe 10 mg daily.  - Ordered lipid panel and other relevant labs in 1 month.    PREDIABETES:  - Patient to reduce alcohol consumption.  - Patient to change diet according to provided dietary recommendations.  - Patient to start exercise with focus on low-impact activities like jogging or swimming.  - New prescriptions to be sent to [facility] pharmacy in [place].          No follow-ups on file.        Future Appointments   Date Time Provider Department Center   7/2/2025  4:15 PM [facility]       This note was generated with the assistance of ambient listening technology. Verbal consent was obtained by the patient and accompanying visitor(s) for the recording of patient appointment to facilitate this note. I attest to having reviewed and edited the generated note for accuracy, though some syntax or spelling errors may persist. Please contact the author of this note for any clarification.                      [1]   Patient Active Problem List  Diagnosis    Essential hypertension    Tobacco dependence: 1 pack/day since age 20    Generalized anxiety disorder    Sinus tachycardia    Mixed hyperlipidemia    [place] cardiac risk >20% in next 10 years    Prediabetes   [2]   Current Outpatient Medications:     citalopram (CELEXA) 20 MG tablet, TAKE 1 TABLET BY MOUTH EVERY DAY, Disp: 90 tablet, Rfl: 0    varenicline tartrate (CHANTIX STARTING  MONTH BOX) 0.5 mg (11)- 1 mg (42) tablet, Take one 0.5mg tab by mouth once daily X3 days,then increase to one 0.5mg tab twice daily X4 days,then increase to one 1mg tab twice daily, Disp: 53 tablet, Rfl: 0    aspirin (ECOTRIN) 81 MG EC tablet, Take 1 tablet (81 mg total) by mouth once daily., Disp: , Rfl:     ezetimibe (ZETIA) 10 mg tablet, Take 1 tablet (10 mg total) by mouth once daily., Disp: 30 tablet, Rfl: 11    lisinopriL-hydrochlorothiazide (PRINZIDE,ZESTORETIC) 10-12.5 mg per tablet, Take 1 tablet by mouth once daily., Disp: 60 tablet, Rfl: 11    metoprolol tartrate (LOPRESSOR) 25 MG tablet, Take 1 tablet (25 mg total) by mouth 2 (two) times daily as needed (for tachycardia)., Disp: 60 tablet, Rfl: 11    rosuvastatin (CRESTOR) 40 MG Tab, Take 1 tablet (40 mg total) by mouth every evening., Disp: 30 tablet, Rfl: 11

## 2025-06-23 NOTE — PATIENT INSTRUCTIONS
"I recommend the book, "The Obesity Code" for weight loss; it recommends intermittent fasting and avoidance of sugar, artificial sweeteners and refined carbohydrates.    Also, here is information on a Mediterranean type diet including fish, the pesco-mediterranean diet from the American College of Cardiology:    1.  Humans are evolutionarily adapted to obtain calories and nutrients from both plant and animal food sources. Many people overconsume animal products, often-processed meats high in saturated fats and chemical additives. In contrast, while strict veganism has gained popularity for many reasons and has value in certain groups, it can cause nutritional deficiencies (vitamin B12, high-quality proteins, iron, zinc, omega-3 fatty acid, vitamin D, and calcium), and predispose to osteopenia, loss of muscle mass, and anemia. This is not true of a lacto-ovo vegetarian diet, which allows no animal-based food except for eggs and dairy. A 6-year study of [id number] [place] [employer] reported a decreased incidence of all-cause mortality when comparing vegetarians with nonvegetarians. However, when the vegetarians were stratified into vegans, lacto-ovo vegetarians, pesco-vegetarians, and semi-vegetarians, the pesco-vegetarians had lowest risks for all-cause mortality, cardiovascular disease (CVD) mortality, and mortality from other causes.     2.  The authors propose a plant-rich diet rich in nuts with fish and seafood as the principle source of animal food. Known as the Pesco-Mediterranean diet, it is supplemented with extra-virgin olive oil (EVOO), which is the principle fat source, along with moderate amounts of dairy (particularly yogurt and cheese) and eggs, as well as modest amounts of alcohol consumption (ideally red wine with the evening meal), but few red and processed meats.     3.  Both epidemiological studies and randomized clinical trials indicate that the traditional Mediterranean diet is associated with " lower risks for all-cause and CVD mortality, coronary heart disease, metabolic syndrome, diabetes, cognitive decline, neurodegenerative diseases (including Alzheimers), depression, overall cancer mortality, and breast and colorectal cancers.     4.  The traditional Mediterranean diet has been endorsed in the most recent Dietary Guidelines for Americans and the American College of Cardiology/American Heart Association guidelines. The 2020 [employer] News & World Report ranked it #1 for overall health based upon it being nutritious, safe, relatively easy to follow, protective against CVD and diabetes, and effective for weight loss.     5.  Fish and seafood are important sources of vitamins protein and omega-3 fatty acids, of which the higher blood and adipose tissue are associated with reduced fatal and nonfatal myocardial infarction. When not fried, fish consumption has been associated with reduced risk of heart failure, and the incidence of the metabolic syndrome, coronary heart disease, ischemic stroke, and sudden cardiac death, particularly when seafood replaces less healthy foods.     6.  Unrestricted use of olive oil in the kitchen, on salads (with vinegar), cooking vegetables, and at the table is the foundation of the traditional Mediterranean diet, although olive oil quality is crucial, which makes it expensive. EVOO retains hydrophilic components of olives including highly bioactive polyphenols, which are believed to underlie many of EVOOs cardiometabolic benefits, such as reduced low-density lipoprotein cholesterol (LDL-C) and increased high-density lipoprotein cholesterol (HDL-C), improved vascular reactivity, enhanced HDL particle functionality, and a lower diabetes risk.     7.  Tree nuts, an integral component of the traditional Mediterranean diet, are nutrient dense rich in unsaturated fats, fiber, protein, polyphenols, phytosterols, and tocopherols. Nut consumption is associated with decreased incidence  and mortality rates from both CVD and coronary artery disease (CAD), as well as atrial fibrillation and diabetes. Randomized controlled trials have shown that diets enriched with nuts produce cardiometabolic benefits including improvements in insulin sensitivity, LDL-C, inflammation, and vascular reactivity. A 1-daily serving of mixed nuts resulted in a 28% reduction in CVD risk. Generous intake of nuts does not promote weight gain because of increased satiety and incomplete digestion.     8.  Legumes are an excellent source of vegetable protein, folate, and magnesium and fiber, and like other seeds including peanuts, are rich in polyphenols. Consumption of legumes has been linked to a reduced risk of incident and fatal CVD and CAD, as well as improvements in blood glucose, cholesterol, blood pressure, and body weight. Legumes, like fish, are a satiating and healthy substitute for red meat and processed meats.     9.  Dairy products and eggs are important sources of protein, nonsodium minerals, probiotics, and vitamin D. Although there is no clear consensus among nutrition experts on the role of dairy products in CVD risk, they are allowed in this Pesco-Mediterranean diet. Fermented low-fat versions, such as yogurt and soft cheeses, are preferred; butter and hard cheese are high in saturated fats and salt.     10.  Eggs are composed of beneficial nutrients including all essential amino acids, in addition to minerals (selenium, phosphorus, iodine, zinc), vitamins (A, D, B2, B12, niacin), and carotenoids (lutein, zeaxanthin). Although each yolk contains about 184 mg of dietary cholesterol, large prospective cohorts suggest that egg consumption is unrelated to serum cholesterol and does not increase CVD risk. Eggs are allowed in the Pesco-Mediterranean diet; egg whites are unlimited and preferably no more than 5 yolks/week.     11.  Whole grains, such as barley, whole oats, rye, corn, buckwheat, brown rice, and quinoa,  are an integral part of the traditional Mediterranean diet. Pasta is an example of a starchy food that has a low glycemic index despite being a refined carbohydrate. In the context of a low glycemic index dietary pattern such as the Mediterranean diet, pasta does not adversely affect adiposity and may even help reduce body weight and there is no evidence that pasta promotes cardiometabolic risk factors. White rice is associated with type 2 diabetes mellitus in Asians but not in Western cohorts, possibly because it is cooked and served plain in [place] and in Western cultures cooked in mixed dishes with vegetables and vegetable oil including EVOO.     12.  The staple beverage of the Pesco-Mediterranean diet is water--which can be flavored but not sweetened. Unsweetened tea and coffee are rich in antioxidants and are associated with improved CVD outcomes. If alcohol is consumed at all, dry red wine is recommended, with the ideal amount being a single glass (6 oz) for women and 1 or 2 glasses/day for men (6-12 oz) consumed with meals.     13.  Time-restricted eating, a type of intermittent fasting, is the practice of limiting the daily intake of calories to a window of time usually between 6-12 hours each day. Intermittent fasting when done on a regular basis has been shown to decrease intra-abdominal adipose tissue and reduce free-radical production. This elicits powerful cellular responses that improve glucose metabolism and reduce systemic inflammation, and may also reduce risks of diabetes, CVD, cancer, and neurodegenerative diseases. After a 12-hour overnight fast, insulin levels are typically low, and glycogen stores have been depleted. In this fasted state, the body starts mobilizing fatty acids from adipose cells to burn as metabolic fuel instead of glucose. This improves insulin sensitivity. Time-restricted eating is not more effective for weight loss than standard calorie-restriction, but appears to enhance CV  health even in nonobese people. Fasting may also lower blood pressure and resting heart rate and improve autonomic balance with augmented heart rate variability.     14.  The evidence regarding time-restricted eating is mostly based on animal models and observational human studies. The most popular form of time-restricted eating involves eating two rather than three meals and compressing the calorie-consumption window. No head-to-head studies have been performed to assess the optimal time window.

## 2025-06-27 VITALS — DIASTOLIC BLOOD PRESSURE: 78 MMHG | SYSTOLIC BLOOD PRESSURE: 118 MMHG

## 2025-06-30 ENCOUNTER — TELEPHONE (OUTPATIENT)
Dept: SMOKING CESSATION | Facility: CLINIC | Age: 50
End: 2025-06-30
Payer: COMMERCIAL

## 2025-06-30 NOTE — TELEPHONE ENCOUNTER
Smoking Cessation called patient regarding no show appointment for follow-up visit on 6/23/25. Left a message with contact for patient to call to reschedule follow up.   Mary Gaming RRT,[facility]  [phone]

## 2025-07-08 DIAGNOSIS — E78.5 HYPERLIPIDEMIA, UNSPECIFIED HYPERLIPIDEMIA TYPE: Primary | ICD-10-CM

## 2025-07-08 RX ORDER — ATORVASTATIN CALCIUM 20 MG/1
20 TABLET, FILM COATED ORAL DAILY
Qty: 90 TABLET | Refills: 3 | Status: SHIPPED | OUTPATIENT
Start: 2025-07-08 | End: 2026-07-08

## 2025-07-11 ENCOUNTER — PATIENT MESSAGE (OUTPATIENT)
Dept: ADMINISTRATIVE | Facility: HOSPITAL | Age: 50
End: 2025-07-11
Payer: COMMERCIAL

## 2025-07-12 DIAGNOSIS — Z12.11 SCREENING FOR COLON CANCER: ICD-10-CM

## 2025-07-14 ENCOUNTER — HOSPITAL ENCOUNTER (EMERGENCY)
Facility: HOSPITAL | Age: 50
Discharge: HOME | End: 2025-07-14
Attending: EMERGENCY MEDICINE
Payer: COMMERCIAL

## 2025-07-14 VITALS
HEIGHT: 73 IN | SYSTOLIC BLOOD PRESSURE: 177 MMHG | WEIGHT: 199 LBS | HEART RATE: 85 BPM | DIASTOLIC BLOOD PRESSURE: 110 MMHG | BODY MASS INDEX: 26.37 KG/M2 | OXYGEN SATURATION: 98 % | RESPIRATION RATE: 16 BRPM | TEMPERATURE: 99 F

## 2025-07-14 DIAGNOSIS — I10 ASYMPTOMATIC HYPERTENSION: Primary | ICD-10-CM

## 2025-07-14 LAB
OHS QRS DURATION: 90 MS
OHS QTC CALCULATION: 443 MS

## 2025-07-14 PROCEDURE — 99283 EMERGENCY DEPT VISIT LOW MDM: CPT | Mod: 25

## 2025-07-14 PROCEDURE — 93005 ELECTROCARDIOGRAM TRACING: CPT

## 2025-07-14 PROCEDURE — 93010 ELECTROCARDIOGRAM REPORT: CPT | Mod: ,,, | Performed by: INTERNAL MEDICINE

## 2025-07-14 NOTE — ED PROVIDER NOTES
Encounter Date: 7/14/2025       History     Chief Complaint   Patient presents with    Hypertension     Reports SBP >200 at home. Denies chest pain. Missed home dose metoprolol yesterday     HPI  49-year-old male with a history of hypertension hyperlipidemia presents with complaint of elevated blood pressure.  He reports that this morning he felt some burning of his eyes after smoking a cigarette and he checked his blood pressure and it was greater than 200 over greater than 100.  He denies chest pain.  Denies shortness of breath.  He reports he may have forgotten to take his metoprolol yesterday.  Chart review shows he takes metoprolol, lisinopril HCTZ combo pill, Chantix for smoking cessation, and a statin.  He was recently seen by Cardiology after having an abnormal EKG on 06/03/2025.    Review of patient's allergies indicates:   Allergen Reactions    Penicillins Rash     Past Medical History:   Diagnosis Date    Depression      History reviewed. No pertinent surgical history.  Family History   Problem Relation Name Age of Onset    Cancer Mother Joanie Prat     Depression Mother Joanie Prat     Hypertension Mother Joanie Prat     Hypertension Maternal Grandfather Magnus Prat     Stroke Maternal Grandfather Magnus Prat     Hypertension Maternal Grandmother Isabel Prat     Stroke Maternal Grandmother Isabel Prat      Social History[1]  Review of Systems    Physical Exam     Initial Vitals   BP Pulse Resp Temp SpO2   07/14/25 0912 07/14/25 0912 07/14/25 0912 07/14/25 0916 07/14/25 0912   (!) 178/118 87 18 98.1 °F (36.7 °C) 98 %      MAP       --                Physical Exam    Nursing note and vitals reviewed.  Constitutional: He appears well-developed and well-nourished.   HENT:   Head: Normocephalic and atraumatic.   Eyes: Conjunctivae and EOM are normal. Pupils are equal, round, and reactive to light.   Neck: Neck supple.   Normal range of motion.  Cardiovascular:  Normal rate, regular rhythm and intact  distal pulses.           Pulmonary/Chest: Breath sounds normal. No respiratory distress. He exhibits no tenderness.   Abdominal: Abdomen is soft. He exhibits no distension. There is no rebound.   Musculoskeletal:         General: No tenderness or edema. Normal range of motion.      Cervical back: Normal range of motion and neck supple.     Neurological: He is alert and oriented to person, place, and time. He has normal strength. No cranial nerve deficit or sensory deficit. GCS score is 15. GCS eye subscore is 4. GCS verbal subscore is 5. GCS motor subscore is 6.   Skin: Skin is warm and dry. Capillary refill takes less than 2 seconds.   Psychiatric: He has a normal mood and affect.         ED Course   Procedures  Labs Reviewed   HEPATITIS C ANTIBODY   HEP C VIRUS HOLD SPECIMEN   HIV 1 / 2 ANTIBODY     EKG Readings: (Independently Interpreted)   Initial Reading: No STEMI. Previous EKG: Compared with most recent EKG Previous EKG Date: 06/03/2025. Rhythm: Normal Sinus Rhythm. Heart Rate: 82.     ECG Results              EKG 12-lead (Final result)        Collection Time Result Time QRS Duration OHS QTC Calculation    07/14/25 09:19:17 07/14/25 10:10:26 90 443                     Final result by Interface, Lab In [facility] (07/14/25 10:11:02)                   Narrative:    Test Reason : I10,    Vent. Rate :  82 BPM     Atrial Rate :  82 BPM     P-R Int : 156 ms          QRS Dur :  90 ms      QT Int : 380 ms       P-R-T Axes :  48  70  26 degrees    QTcB Int : 443 ms    Normal sinus rhythm  Nonspecific ST and/or T wave abnormalities  Abnormal ECG  When compared with ECG of 03-Jun-2025 13:41,  T wave inversion now evident in Anterior leads  Confirmed by Wily Ramirez ([id number]) on 7/14/2025 10:10:24 AM    Referred By:            Confirmed By: Wily Ramirez                                  Imaging Results    None          Medications - No data to display  Medical Decision Making  No indication for further testing given  asymptomatic hypertension.  The patient's blood pressure is already improving after taking his home medications.  I counseled him to continue his lifestyle modifications, especially smoking cessation in the setting of tobacco use disorder.  I counseled him to recheck his blood pressure daily in his if his still running elevated 5 days from now, to follow up with his primary care or cardiology to titrate his medications.  He did receive an EKG by triage protocol.  It appeared to be improved from previous with a decreased rate and less pronounced T-wave inversion                                          Clinical Impression:  Final diagnoses:  [I10] Hypertension                       [1]   Social History  Tobacco Use    Smoking status: Every Day     Current packs/day: 1.50     Average packs/day: 1.5 packs/day for 29.5 years (44.3 ttl pk-yrs)     Types: Cigarettes     Start date: 1/1/1996   Substance Use Topics    Alcohol use: Yes     Alcohol/week: 6.0 standard drinks of alcohol     Types: 6 Shots of liquor per week     Comment: occasionally    Drug use: Never        Sessions, Sharif HOWARD MD  07/14/25 [id number]

## 2025-07-14 NOTE — ED TRIAGE NOTES
49 y.o. male presenting to the ED with CC of HTN. States he did forget to take a home dose of metoprolol yesterday. Pt. Denies headache, dizziness, chest pain, or sob

## 2025-07-14 NOTE — DISCHARGE INSTRUCTIONS
If your blood pressure is still running high (greater than 140/80) 5 days from now on 07/19/2025, schedule follow up with your primary care cardiology within the next 2 weeks to adjust your medication    Continue your lifestyle changes to help lower your blood pressure and cholesterol    Return to the emergency department if you experience chest pain, severe headache, or any new or concerning symptoms

## 2025-07-15 ENCOUNTER — CLINICAL SUPPORT (OUTPATIENT)
Dept: SMOKING CESSATION | Facility: CLINIC | Age: 50
End: 2025-07-15
Payer: COMMERCIAL

## 2025-07-15 DIAGNOSIS — F17.200 NICOTINE DEPENDENCE: Primary | ICD-10-CM

## 2025-07-15 PROCEDURE — 99999 PR PBB SHADOW E&M-EST. PATIENT-LVL II: CPT | Mod: PBBFAC,,,

## 2025-07-15 NOTE — PROGRESS NOTES
"Individual Follow-Up Form    7/15/2025    Quit Date:     Clinical Status of Patient: Outpatient    Length of Service: 30 minutes    Continuing Medication: yes  Chantix    Other Medications:      Target Symptoms: Withdrawal and medication side effects. The following were  rated moderate (3) to severe (4) on TCRS:  Moderate (3): urge  Severe (4): none    Comments: Spoke to patient this morning in regard to smoking cessation progress update. Patient reported that he is smoking less than a pack per day, not sure how many. Suggested that he keep a daily smoking log to help make himself accountable. Patient has not moved cigarettes he continues to keep them by his bed and next to him during the day. He states that he is smoking about every 2 hours during work and every 30 min in the evening. He is only buying one pack at a time. Encouraged patient to move his cigarettes out of his bedroom and place in a drawer "out of site out of mind". Reviewed habit verses nicotine. Suggested that he set a goal everyday and don't go over that goal. Reviewed strategies, controlling environment, cues, triggers, new goals set. Introduced high risk situations with preparation interventions, alcohol, understanding urges, cravings, stress and relaxation. Open discussion with intervention discussion. Patient schedule clinic follow up for 7/23/25 after he has blood work. The patient remains on the prescribed tobacco cessation medication regimen of 1 mg Chantix BID without any negative side effects at this time. The patient denies any abnormal behavioral or mental changes at this time.        Diagnosis: F17.200    Next Visit: 1 week    "

## 2025-07-30 ENCOUNTER — TELEPHONE (OUTPATIENT)
Dept: SMOKING CESSATION | Facility: CLINIC | Age: 50
End: 2025-07-30
Payer: COMMERCIAL

## 2025-07-30 NOTE — TELEPHONE ENCOUNTER
Smoking Cessation called patient regarding no show clinic appointment for follow-up visit this morning 7/30/25. Left a message with contact for patient to call to reschedule follow up.   Mary Gaming RRT,[facility]  [phone]

## 2025-08-16 ENCOUNTER — TELEPHONE (OUTPATIENT)
Dept: PHARMACY | Facility: CLINIC | Age: 50
End: 2025-08-16
Payer: COMMERCIAL

## 2025-09-03 ENCOUNTER — CLINICAL SUPPORT (OUTPATIENT)
Dept: SMOKING CESSATION | Facility: CLINIC | Age: 50
End: 2025-09-03
Payer: COMMERCIAL

## 2025-09-03 DIAGNOSIS — F17.200 NICOTINE DEPENDENCE: Primary | ICD-10-CM

## 2025-09-03 PROCEDURE — 99999 PR PBB SHADOW E&M-EST. PATIENT-LVL I: CPT | Mod: PBBFAC,,,
